# Patient Record
Sex: FEMALE | Race: WHITE | NOT HISPANIC OR LATINO | URBAN - METROPOLITAN AREA
[De-identification: names, ages, dates, MRNs, and addresses within clinical notes are randomized per-mention and may not be internally consistent; named-entity substitution may affect disease eponyms.]

---

## 2025-02-28 ENCOUNTER — APPOINTMENT (OUTPATIENT)
Dept: PRIMARY CARE | Facility: CLINIC | Age: 25
End: 2025-02-28
Payer: COMMERCIAL

## 2025-02-28 VITALS
RESPIRATION RATE: 20 BRPM | HEIGHT: 63 IN | HEART RATE: 88 BPM | OXYGEN SATURATION: 99 % | DIASTOLIC BLOOD PRESSURE: 74 MMHG | BODY MASS INDEX: 21.46 KG/M2 | SYSTOLIC BLOOD PRESSURE: 114 MMHG | WEIGHT: 121.1 LBS

## 2025-02-28 DIAGNOSIS — Z00.00 ANNUAL PHYSICAL EXAM: Primary | ICD-10-CM

## 2025-02-28 DIAGNOSIS — F41.9 ANXIETY: ICD-10-CM

## 2025-02-28 PROCEDURE — 1036F TOBACCO NON-USER: CPT | Performed by: INTERNAL MEDICINE

## 2025-02-28 PROCEDURE — 99385 PREV VISIT NEW AGE 18-39: CPT | Performed by: INTERNAL MEDICINE

## 2025-02-28 PROCEDURE — 3008F BODY MASS INDEX DOCD: CPT | Performed by: INTERNAL MEDICINE

## 2025-02-28 PROCEDURE — 99202 OFFICE O/P NEW SF 15 MIN: CPT | Performed by: INTERNAL MEDICINE

## 2025-02-28 ASSESSMENT — ENCOUNTER SYMPTOMS
HEADACHES: 0
SHORTNESS OF BREATH: 0
SLEEP DISTURBANCE: 0
FATIGUE: 0
CONSTIPATION: 0
DIZZINESS: 0
BLOOD IN STOOL: 0

## 2025-02-28 ASSESSMENT — PATIENT HEALTH QUESTIONNAIRE - PHQ9
SUM OF ALL RESPONSES TO PHQ9 QUESTIONS 1 AND 2: 0
1. LITTLE INTEREST OR PLEASURE IN DOING THINGS: NOT AT ALL
2. FEELING DOWN, DEPRESSED OR HOPELESS: NOT AT ALL

## 2025-02-28 NOTE — PROGRESS NOTES
"Subjective   Patient ID: Kalani Dickson is a 24 y.o. female who presents for Annual Exam.    Pt presents to get established and for a physical.    Pt denies significant PMH. Not on any medications or supplements.    Sleeps on average 7-8 hours a night.    Pt feels like she has had more anxiety the last few years.         Review of Systems   Constitutional:  Negative for fatigue.   Respiratory:  Negative for shortness of breath.    Cardiovascular:  Negative for chest pain.   Gastrointestinal:  Negative for blood in stool and constipation.   Neurological:  Negative for dizziness and headaches.   Psychiatric/Behavioral:  Negative for sleep disturbance.        /74 (BP Location: Right arm, Patient Position: Sitting)   Pulse 88   Resp 20   Ht 1.607 m (5' 3.25\")   Wt 54.9 kg (121 lb 1.6 oz)   SpO2 99%   BMI 21.28 kg/m²   Objective   Physical Exam  Constitutional:       General: She is not in acute distress.     Appearance: She is not ill-appearing, toxic-appearing or diaphoretic.   HENT:      Head: Normocephalic and atraumatic.   Eyes:      Conjunctiva/sclera: Conjunctivae normal.   Cardiovascular:      Rate and Rhythm: Normal rate and regular rhythm.      Heart sounds: No murmur heard.     No friction rub. No gallop.   Pulmonary:      Effort: Pulmonary effort is normal. No respiratory distress.      Breath sounds: No stridor. No wheezing, rhonchi or rales.   Abdominal:      General: Abdomen is flat. Bowel sounds are normal. There is no distension.      Palpations: Abdomen is soft.      Tenderness: There is no abdominal tenderness. There is no guarding.   Musculoskeletal:      Cervical back: Normal range of motion. No rigidity or tenderness.   Lymphadenopathy:      Cervical: No cervical adenopathy.   Skin:     General: Skin is warm and dry.   Neurological:      Mental Status: She is alert.         Assessment/Plan   Problem List Items Addressed This Visit             ICD-10-CM    Anxiety F41.9     -Pt feels like " she has had worsening anxiety the last couple years. Can't tie it to anything particularly. Denies feeling down or depressed.  -Discussed therapy options at , local options, and [facility].  -Due to uncertainty of diagnosis and what to do next, we agreed to have pt first seen by psychiatry to ensure nothing else is contributing to this. Referral placed. Will follow up recommendations.         Relevant Orders    Referral to [facility]     Other Visit Diagnoses         Codes    Annual physical exam    -  Primary Z00.00    Relevant Orders    Comprehensive metabolic panel    CBC    Lipid panel    Tsh With Reflex To Free T4 If Abnormal    Referral to Gynecology        -Labwork as above.  -Pt to see gynecology to get established.  -If doing well will see back in 1 year.         Kell Plasencia MD 02/28/25 8:48 AM

## 2025-03-04 ENCOUNTER — APPOINTMENT (OUTPATIENT)
Dept: PRIMARY CARE | Facility: CLINIC | Age: 25
End: 2025-03-04
Payer: COMMERCIAL

## 2025-03-19 ENCOUNTER — APPOINTMENT (OUTPATIENT)
Dept: OBSTETRICS AND GYNECOLOGY | Facility: CLINIC | Age: 25
End: 2025-03-19
Payer: COMMERCIAL

## 2025-04-21 ENCOUNTER — APPOINTMENT (OUTPATIENT)
Dept: BEHAVIORAL HEALTH | Facility: CLINIC | Age: 25
End: 2025-04-21
Payer: COMMERCIAL

## 2025-04-24 ENCOUNTER — TELEMEDICINE (OUTPATIENT)
Dept: BEHAVIORAL HEALTH | Facility: CLINIC | Age: 25
End: 2025-04-24
Payer: COMMERCIAL

## 2025-04-24 DIAGNOSIS — F41.1 GAD (GENERALIZED ANXIETY DISORDER): ICD-10-CM

## 2025-04-24 DIAGNOSIS — Z79.899 MEDICATION MANAGEMENT: ICD-10-CM

## 2025-04-24 PROCEDURE — 99204 OFFICE O/P NEW MOD 45 MIN: CPT | Performed by: REGISTERED NURSE

## 2025-04-24 PROCEDURE — 1036F TOBACCO NON-USER: CPT | Performed by: REGISTERED NURSE

## 2025-04-24 RX ORDER — SERTRALINE HYDROCHLORIDE 50 MG/1
50 TABLET, FILM COATED ORAL DAILY
Qty: 90 TABLET | Refills: 0 | Status: SHIPPED | OUTPATIENT
Start: 2025-04-24 | End: 2025-07-23

## 2025-04-24 ASSESSMENT — ANXIETY QUESTIONNAIRES
6. BECOMING EASILY ANNOYED OR IRRITABLE: SEVERAL DAYS
7. FEELING AFRAID AS IF SOMETHING AWFUL MIGHT HAPPEN: SEVERAL DAYS
IF YOU CHECKED OFF ANY PROBLEMS ON THIS QUESTIONNAIRE, HOW DIFFICULT HAVE THESE PROBLEMS MADE IT FOR YOU TO DO YOUR WORK, TAKE CARE OF THINGS AT HOME, OR GET ALONG WITH OTHER PEOPLE: VERY DIFFICULT
4. TROUBLE RELAXING: SEVERAL DAYS
1. FEELING NERVOUS, ANXIOUS, OR ON EDGE: NEARLY EVERY DAY
2. NOT BEING ABLE TO STOP OR CONTROL WORRYING: MORE THAN HALF THE DAYS
5. BEING SO RESTLESS THAT IT IS HARD TO SIT STILL: SEVERAL DAYS
3. WORRYING TOO MUCH ABOUT DIFFERENT THINGS: MORE THAN HALF THE DAYS
GAD7 TOTAL SCORE: 11

## 2025-04-24 ASSESSMENT — PATIENT HEALTH QUESTIONNAIRE - PHQ9
SUM OF ALL RESPONSES TO PHQ9 QUESTIONS 1 & 2: 0
2. FEELING DOWN, DEPRESSED OR HOPELESS: NOT AT ALL
1. LITTLE INTEREST OR PLEASURE IN DOING THINGS: NOT AT ALL

## 2025-04-24 NOTE — PROGRESS NOTES
"Virtual or Telephone Consent    An interactive audio and video telecommunication system which permits real time communications between the patient (at the originating site) and provider (at the distant site) was utilized to provide this telehealth service.   Verbal consent was requested and obtained from Kalani Dickson on this date, 04/24/25 for a telehealth visit and the patient's location was confirmed at the time of the visit.    HPI  Kalani Dickson is a 24 y.o. female patient with a chief complaint of   Chief Complaint   Patient presents with   • Anxiety   • Med Management    presenting to outpatient treatment for a scheduled psych outpatient psychiatric evaluation.    Kalani explains that symptoms of anxiety began in 2022.   Patient explains that during this time \"I was in college graduating but was unsure of what was triggering the anxiety. Recently, patient explains that anxiety started to occur everyday without warning.   The duration of symptoms occurred for 3 years.   Aggravating factors of anxiety are life's stressor.  Relieving factors of anxiety are exercise.  Patient ranks the severity of anxiety using the JERMAINE 7 scale with a rating of 11 for moderate anxiety symptoms. Patient ranks the severity of depression using the PHQ 9 scale with a rating of 0 for no depressive symptoms.     NOTE: Symptom scale is rated where 0 = no symptoms at all, and 10 = symptoms so severe that pt is an imminent danger to themselves or others and requires hospitalization.    Anxiety remain(s) present more days than not, which has remained unchanged over the past few weeks. Kalani Dickson rates the severity of psych symptoms as a 6/10, noting symptom improvement with exercise and worsening of symptoms with denies.    Psychiatric Review Of Systems:  -Mood:  Depressive Symptoms: negative  Manic Symptoms: negative  Anxiety Symptoms: General Anxiety Disorder (JERMAINE)JREMAINE Behaviors: difficult to control worry, excessive " anxiety/worry, and difficulty concentrating  Psychotic Symptoms: negative  Other Symptoms:  -Sleep/Energy/Motivation: Sleep is good. Energy and motivation is good.  -Appetite/Weight Changes: Appetite is good. No weight changes  -Psychosis: denies  -SI/HI: denies      HISTORY  PSYCH HISTORY  -Psych Hx: JERMAINE  -Psych Hospitalization Hx: denies  -Suicide Attempt Hx: denies  -Self-Harm/Violence Hx: denies  -Current psych meds: n/a  -Psych Med Hx: n/a    SUBSTANCE USE HISTORY  -Substance Use Hx: denies  -Alcohol: denies  -Tobacco: denies  -Caffeine: denies  -Substance Abuse Treatment Hx: denies    FAMILY HISTORY  -Family Psych Hx: denies  -Family Suicide Hx: denies  -Family Substance Abuse Hx: denies    SOCIAL HISTORY  -Supports: mother  -Housing: lives in house with mom  -Income:  at [employer]  -Education: Bachelor's Degree  -Legal: denies  -Abuse Hx: denies    Medical History[1]    -PCP: @PCP@  -Pt reports currently is not pregnant, and currently is not sexually active, does not use birth control. LMP: April 2025  -TBI/head trauma/LOC/seizure hx: denies    REVIEW OF SYSTEMS  Review of Systems   All other systems reviewed and are negative.    Objective   Physical Exam  Psychiatric:         Attention and Perception: Attention and perception normal.         Mood and Affect: Mood and affect normal.         Speech: Speech normal.         Behavior: Behavior normal. Behavior is cooperative.         Thought Content: Thought content normal.         Cognition and Memory: Cognition and memory normal.         Judgment: Judgment normal.     MEDICAL-DECISION MAKING  Begin Sertraline 50mg PO Daily for anxiety.  Patient educated and verbalized understanding on benefits, risks, and side effects of all psychiatric medications. Outside referrals for psychotherapy sent to patient via [facility]. Follow-up with psychiatry in 4 weeks for medication evaluation and effectiveness.        Psych med regimen as follows:   1.  Sertraline 50mg PO Daily    IMPRESSION  - JERMAINE  - Medication Management    SI/HI ASSESSMENT  -Risk Assessment: The pt is currently a low acute risk of suicide and self-harm due to no past suicide attempt(s) and not currently endorsing thoughts of suicide. The pt is currently a low acute risk of violence and harm to others due to no past history of violence and not currently threatening others.  -Suicidal Risk Factors:  and current psychiatric illness  -Violence Risk Factors: none  -Protective Factors: strong coping skills and employment  -Plan to Reduce Risk: Establish medication regimen, outpatient follow-up care, and increase coping skills   Denied current suicidal ideation or thoughts of harm to self, denied homicidal ideation or thoughts of harm to others. No delusional thinking elicited. No perseverations or obsessions identified.       PLAN  -Continue Medications as directed.  -Follow-up with this provider in 4 weeks.  -Risks/benefits/assessment of medication interventions discussed with pt; pt agreeable to plan. Will continue to monitor for symptoms mgmt and SEs and adjust plan as needed.  -MI to increase coping skills/behavior regulation.  -Safety plan reviewed.  -Call  Psychiatry at [phone] with issues.  -For [facility] residents, [place] is a 24/7 hotline you can call for assistance at [phone]) [phone]. Please call 485 or go to your closest Emergency Room if you feel worse. This includes thoughts of hurting yourself or anyone else, or having other troubles such as hearing voices, seeing visions, or having new and scary thoughts about the people around you.    Reviewed OARRS on [04/24/25] by [Joon Lemus, APRN-CNP] -OARRS has been reviewed and is consistent with prescribed medications, Considered the risks of abuse, dependence, addiction and diversion, Medication is felt to be clinically appropriate based on documented diagnosis.    No visits with results within 6 Month(s) from  this visit.   Latest known visit with results is:   Legacy Encounter on 06/20/2019   Component Date Value   • Group A Strep PCR 06/20/2019 NOT DETECTED        Last Urine Drug Screen / ordered today: No  No results found for this or any previous visit (from the past 8760 hours).  N/A          SAMANTHA Urena-CNP         [1]  History reviewed. No pertinent past medical history.

## 2025-06-11 ENCOUNTER — APPOINTMENT (OUTPATIENT)
Dept: BEHAVIORAL HEALTH | Facility: CLINIC | Age: 25
End: 2025-06-11
Payer: COMMERCIAL

## 2026-03-03 ENCOUNTER — APPOINTMENT (OUTPATIENT)
Dept: PRIMARY CARE | Facility: CLINIC | Age: 26
End: 2026-03-03
Payer: COMMERCIAL